# Patient Record
Sex: FEMALE | Race: WHITE | URBAN - METROPOLITAN AREA
[De-identification: names, ages, dates, MRNs, and addresses within clinical notes are randomized per-mention and may not be internally consistent; named-entity substitution may affect disease eponyms.]

---

## 2021-01-06 ENCOUNTER — OFFICE VISIT (OUTPATIENT)
Dept: URBAN - METROPOLITAN AREA CLINIC 32 | Facility: CLINIC | Age: 66
End: 2021-01-06
Payer: COMMERCIAL

## 2021-01-06 DIAGNOSIS — H25.13 CATARACT - NSC: ICD-10-CM

## 2021-01-06 DIAGNOSIS — H33.8 OTHER RETINAL DETACHMENTS: Primary | ICD-10-CM

## 2021-01-06 PROCEDURE — 92134 CPTRZ OPH DX IMG PST SGM RTA: CPT | Performed by: OPTOMETRIST

## 2021-01-06 PROCEDURE — 99204 OFFICE O/P NEW MOD 45 MIN: CPT | Performed by: OPTOMETRIST

## 2021-01-06 ASSESSMENT — INTRAOCULAR PRESSURE
OS: 17
OD: 17

## 2021-01-06 NOTE — IMPRESSION/PLAN
Impression: Cataract - NSC: H25.13. Plan: Cataracts account for the patient's complaints. Patient education was discussed regarding cataracts, lens options and surgery. Recommend Cataract consultation with surgeon. Order A-Scan and Corneal Topography. Hold off on filling any new glasses prescriptions until after the consultation. hold off until after retina eval  RTC 1-2 weeks for cataract consultation.

## 2021-01-06 NOTE — IMPRESSION/PLAN
Impression: Other retinal detachments: H33.8.  Plan: OD suspect retinal detachment, refer for retina eval 1-2 days

## 2021-01-07 ENCOUNTER — OFFICE VISIT (OUTPATIENT)
Dept: URBAN - METROPOLITAN AREA CLINIC 33 | Facility: CLINIC | Age: 66
End: 2021-01-07
Payer: COMMERCIAL

## 2021-01-07 DIAGNOSIS — H33.011 RETINAL DETACHMENT WITH SINGLE BREAK, RIGHT EYE: Primary | ICD-10-CM

## 2021-01-07 PROCEDURE — 92134 CPTRZ OPH DX IMG PST SGM RTA: CPT | Performed by: OPHTHALMOLOGY

## 2021-01-07 PROCEDURE — 92004 COMPRE OPH EXAM NEW PT 1/>: CPT | Performed by: OPHTHALMOLOGY

## 2021-01-07 RX ORDER — PREDNISOLONE ACETATE 10 MG/ML
1 % SUSPENSION/ DROPS OPHTHALMIC
Qty: 10 | Refills: 5 | Status: ACTIVE
Start: 2021-01-07

## 2021-01-07 RX ORDER — OFLOXACIN 3 MG/ML
0.3 % SOLUTION/ DROPS OPHTHALMIC
Qty: 5 | Refills: 3 | Status: INACTIVE
Start: 2021-01-07 | End: 2021-03-05

## 2021-01-07 ASSESSMENT — INTRAOCULAR PRESSURE
OD: 10
OS: 10

## 2021-01-07 NOTE — IMPRESSION/PLAN
Impression: Age-related nuclear cataract, bilateral: H25.13. Bilateral. Condition: stable. Vision: vision affected. Plan: Cataracts account for the patient's complaints. No treatment currently recommended. The patient will monitor vision changes and contact us with any decrease in vision.

## 2021-01-07 NOTE — IMPRESSION/PLAN
Impression: Retinal detachment with single break, right eye: H33.011. Right. Condition: macula on. Vision: vision affected. Plan: Advised patient of condition. Gonio exam of right eye shows a Temporal Retinal Detachment MAC ON, suspicious temporal retinal vein. Discussed diagnosis in detail with patient. Discussed risks of progression. Emergent surgical treatment is recommended to repair the retina PPVx RIGHT EYE. Surgical risks and benefits were discussed, explained and understood by patient. Unable to tell how much vision will be recovered. Discussed gas bubble and post-op care: no traveling, flying or high altitude for approximately 6 - 8 weeks. All questions answered. Advised patient to remain sedentary until her surgery tomorrow, in order to limit any further detachment of the retina. Patient elects to proceed with recommendation. RL1. Educational material provided to patient. Erxed Drops to pharmacy on Ofloxacin and Prednisolone _ Dr. Krista Chamorro aware of patient's allergy to Levofloxacin- reaction is tendonitis Okay to use Ofloxacin.  
OCT OD shows Mac On

## 2021-01-08 ENCOUNTER — SURGERY (OUTPATIENT)
Dept: URBAN - METROPOLITAN AREA SURGERY 11 | Facility: SURGERY | Age: 66
End: 2021-01-08
Payer: COMMERCIAL

## 2021-01-08 PROCEDURE — 67108 REPAIR DETACHED RETINA: CPT | Performed by: OPHTHALMOLOGY

## 2021-01-09 ENCOUNTER — POST-OPERATIVE VISIT (OUTPATIENT)
Dept: URBAN - METROPOLITAN AREA CLINIC 23 | Facility: CLINIC | Age: 66
End: 2021-01-09
Payer: COMMERCIAL

## 2021-01-09 PROCEDURE — 99024 POSTOP FOLLOW-UP VISIT: CPT | Performed by: OPTOMETRIST

## 2021-01-09 ASSESSMENT — INTRAOCULAR PRESSURE
OD: 17
OS: 12

## 2021-01-09 NOTE — IMPRESSION/PLAN
Impression: S/P 25g PPVX, AFX, C3F8 18%, EL OD - 1 Day. Encounter for surgical aftercare following surgery on a sense organ  Z48.810. Condition is improving - Plan: The surgical eye(s) is improving well. Continue to follow current drop plan and post operative instructions. Recommend artificial tears throughout post operative period. RTC for scheduled follow up.

## 2021-01-18 ENCOUNTER — POST-OPERATIVE VISIT (OUTPATIENT)
Dept: URBAN - METROPOLITAN AREA CLINIC 23 | Facility: CLINIC | Age: 66
End: 2021-01-18

## 2021-01-18 PROCEDURE — 99024 POSTOP FOLLOW-UP VISIT: CPT | Performed by: OPHTHALMOLOGY

## 2021-01-18 ASSESSMENT — INTRAOCULAR PRESSURE
OS: 17
OD: 9

## 2021-01-18 NOTE — IMPRESSION/PLAN
Impression: S/P 25g PPVX, AFX, C3F8 18%, EL OD - 10 Days. Encounter for surgical aftercare following surgery on a sense organ  Z48.810. Excellent post op course   Post operative instructions reviewed - Condition is improving - Plan: Due to Coronavirus COVID-19 pandemic and National Emergency, deferred Slit Lamp examination. Findings are based on OCT and Optos. Unable to obtain OCT due to Gas filled eye OD and Optos shows gas filled eye, retina fully attached. Advised patient to continue using Prednisolone QID OD until bottle empty then D/C. Recommend a retina follow - up in 1 mos.  --Continue Prednisolone acetate 1% QID OD until bottle empty then D/C

## 2021-02-04 ENCOUNTER — POST-OPERATIVE VISIT (OUTPATIENT)
Dept: URBAN - METROPOLITAN AREA CLINIC 23 | Facility: CLINIC | Age: 66
End: 2021-02-04

## 2021-02-04 PROCEDURE — 99024 POSTOP FOLLOW-UP VISIT: CPT | Performed by: OPTOMETRIST

## 2021-02-04 ASSESSMENT — INTRAOCULAR PRESSURE
OD: 11
OS: 15

## 2021-02-04 NOTE — IMPRESSION/PLAN
Impression: S/P 25g PPVX, AFX, C3F8 18%, EL OD - 27 Days. Encounter for surgical aftercare following surgery on a sense organ  Z48.810. Post operative instructions reviewed - Continue Prednisolone as directed. Very poor view of periphery through cataract, will refer to Dr. Aileen Rogers tomorrow. Consider cataract surgery ASAP.  Plan:

## 2021-02-05 ENCOUNTER — POST-OPERATIVE VISIT (OUTPATIENT)
Dept: URBAN - METROPOLITAN AREA CLINIC 23 | Facility: CLINIC | Age: 66
End: 2021-02-05

## 2021-02-05 PROCEDURE — 99024 POSTOP FOLLOW-UP VISIT: CPT | Performed by: OPHTHALMOLOGY

## 2021-02-05 ASSESSMENT — INTRAOCULAR PRESSURE
OD: 9
OS: 16

## 2021-02-05 NOTE — IMPRESSION/PLAN
Impression: S/P 25g PPVX, AFX, C3F8 18%, EL OD - 28 Days. Encounter for surgical aftercare following surgery on a sense organ  Z48.810. Excellent post op course   Post operative instructions reviewed - Condition is improving - Plan: Excellent post op course   Post operative instructions reviewed - Condition is improving - Discussed with patient exam of the right eye shows dense cataract making the vision blurry and the gas bubble, retina appears to be fully reattached. Advised patient that once the gas bubble is completely dissolved and the retina is stable can reassess and schedule patient for a cataract eval for the right eye.  --Continue Prednisolone acetate 1% QID OD until bottle empty then D/C

## 2021-03-05 ENCOUNTER — POST-OPERATIVE VISIT (OUTPATIENT)
Dept: URBAN - METROPOLITAN AREA CLINIC 23 | Facility: CLINIC | Age: 66
End: 2021-03-05
Payer: COMMERCIAL

## 2021-03-05 PROCEDURE — 99024 POSTOP FOLLOW-UP VISIT: CPT | Performed by: OPHTHALMOLOGY

## 2021-03-05 ASSESSMENT — INTRAOCULAR PRESSURE
OD: 10
OS: 17

## 2021-03-05 NOTE — IMPRESSION/PLAN
Impression: S/P 25g PPVX, AFX, C3F8 18%, EL OD - 56 Days. Encounter for surgical aftercare following surgery on a sense organ  Z48.810.  Excellent post op course   Post operative instructions reviewed - Condition is improving - OCT OD macula stable no edema Optos OD Retina fully reattached with gas bubble Plan: Excellent post op course   Post operative instructions reviewed - Condition is improving - Advised patient will follow up in 1 month to reassess the retina at that time if the retina is stable can proceed with cataract surgery OD. --Continue Prednisolone acetate 1% QID OD until bottle empty then D/C

## 2021-04-02 ENCOUNTER — POST-OPERATIVE VISIT (OUTPATIENT)
Dept: URBAN - METROPOLITAN AREA CLINIC 23 | Facility: CLINIC | Age: 66
End: 2021-04-02
Payer: COMMERCIAL

## 2021-04-02 PROCEDURE — 99024 POSTOP FOLLOW-UP VISIT: CPT | Performed by: OPHTHALMOLOGY

## 2021-04-02 ASSESSMENT — INTRAOCULAR PRESSURE
OD: 12
OS: 15

## 2021-04-02 NOTE — IMPRESSION/PLAN
Impression: S/P 25g PPVX, AFX, C3F8 18%, EL OD - 84 Days. Encounter for surgical aftercare following surgery on a sense organ  Z48.810. Excellent post op course   Post operative instructions reviewed - Condition is improving - Plan: Excellent post op course   Post operative instructions reviewed - Condition is improving -  Discussed with patient that she has a significant cataract, recommend cataract eval OD ASAP per Dr. Niurka Perez.

## 2021-04-07 ENCOUNTER — OFFICE VISIT (OUTPATIENT)
Dept: URBAN - METROPOLITAN AREA CLINIC 23 | Facility: CLINIC | Age: 66
End: 2021-04-07
Payer: COMMERCIAL

## 2021-04-07 DIAGNOSIS — H25.12 AGE-RELATED NUCLEAR CATARACT, LEFT EYE: ICD-10-CM

## 2021-04-07 PROCEDURE — 99214 OFFICE O/P EST MOD 30 MIN: CPT | Performed by: OPHTHALMOLOGY

## 2021-04-07 ASSESSMENT — VISUAL ACUITY
OS: 20/40
OD: 20/400

## 2021-04-07 ASSESSMENT — INTRAOCULAR PRESSURE
OD: 12
OS: 14

## 2021-04-07 NOTE — IMPRESSION/PLAN
Impression: Combined forms of age-related cataract, right eye: H25.811. Condition: established, worsening. Symptoms: could improve with surgery. S/P myopic LASIK  Plan: Cataract accounts for patient's complaints. Reviewed risks, benefits, and procedure. Patient desires surgery, schedule ce/iol OD then OS, RL2, standard IOL, distance refractive target, patient is clear for surgery in [place] [id number]. Discussed with patient the possible need for second surgery due to history of LASIK. Recommend ORA and Amy in [place] to help prevent secondary surgery.

## 2021-04-07 NOTE — IMPRESSION/PLAN
Impression: Age-related nuclear cataract, left eye: H25.12. Condition: established, worsening. Symptoms: could improve with surgery.  Plan: as above

## 2021-05-10 ENCOUNTER — PRE-OPERATIVE VISIT (OUTPATIENT)
Dept: URBAN - METROPOLITAN AREA CLINIC 23 | Facility: CLINIC | Age: 66
End: 2021-05-10
Payer: COMMERCIAL

## 2021-05-10 DIAGNOSIS — H25.811 COMBINED FORMS OF AGE-RELATED CATARACT, RIGHT EYE: Primary | ICD-10-CM

## 2021-05-10 PROCEDURE — 76519 ECHO EXAM OF EYE: CPT | Performed by: OPHTHALMOLOGY

## 2021-05-10 ASSESSMENT — PACHYMETRY
OS: 3.92
OD: 26.60
OD: 3.85
OS: 26.68

## 2021-05-19 ENCOUNTER — SURGERY (OUTPATIENT)
Dept: URBAN - METROPOLITAN AREA SURGERY 11 | Facility: SURGERY | Age: 66
End: 2021-05-19
Payer: COMMERCIAL

## 2021-05-19 PROCEDURE — 66984 XCAPSL CTRC RMVL W/O ECP: CPT | Performed by: OPHTHALMOLOGY

## 2021-05-20 ENCOUNTER — POST-OPERATIVE VISIT (OUTPATIENT)
Dept: URBAN - METROPOLITAN AREA CLINIC 23 | Facility: CLINIC | Age: 66
End: 2021-05-20

## 2021-05-20 DIAGNOSIS — Z48.810 ENCOUNTER FOR SURGICAL AFTERCARE FOLLOWING SURGERY ON A SENSE ORGAN: Primary | ICD-10-CM

## 2021-05-20 PROCEDURE — 99024 POSTOP FOLLOW-UP VISIT: CPT | Performed by: OPTOMETRIST

## 2021-05-20 ASSESSMENT — INTRAOCULAR PRESSURE
OD: 20
OS: 16

## 2021-05-20 NOTE — IMPRESSION/PLAN
Impression: S/P Cataract Extraction by phacoemulsification with IOL placement; DEXYCU; ORA OD - 1 Day. Encounter for surgical aftercare following surgery on a sense organ  Z48.810. Plan: Return if sudden vision change or increasing pain.

## 2021-05-27 ENCOUNTER — POST-OPERATIVE VISIT (OUTPATIENT)
Dept: URBAN - METROPOLITAN AREA CLINIC 23 | Facility: CLINIC | Age: 66
End: 2021-05-27
Payer: COMMERCIAL

## 2021-05-27 PROCEDURE — 99024 POSTOP FOLLOW-UP VISIT: CPT | Performed by: OPTOMETRIST

## 2021-05-27 ASSESSMENT — VISUAL ACUITY
OD: 20/50-
OS: 20/40-

## 2021-05-27 ASSESSMENT — INTRAOCULAR PRESSURE
OS: 15
OD: 15

## 2021-05-27 NOTE — IMPRESSION/PLAN
Impression: S/P Cataract Extraction by phacoemulsification with IOL placement; DEXYCU; ORA OD - 8 Days. Encounter for surgical aftercare following surgery on a sense organ  Z48.810. Post operative instructions reviewed - Plan: Return if sudden vision change or increasing pain.

## 2021-06-01 ENCOUNTER — SURGERY (OUTPATIENT)
Dept: URBAN - METROPOLITAN AREA SURGERY 11 | Facility: SURGERY | Age: 66
End: 2021-06-01
Payer: COMMERCIAL

## 2021-06-01 PROCEDURE — 66984 XCAPSL CTRC RMVL W/O ECP: CPT | Performed by: OPHTHALMOLOGY

## 2021-06-02 ENCOUNTER — POST-OPERATIVE VISIT (OUTPATIENT)
Dept: URBAN - METROPOLITAN AREA CLINIC 23 | Facility: CLINIC | Age: 66
End: 2021-06-02

## 2021-06-02 PROCEDURE — 99024 POSTOP FOLLOW-UP VISIT: CPT | Performed by: OPTOMETRIST

## 2021-06-02 ASSESSMENT — INTRAOCULAR PRESSURE
OS: 40
OD: 10
OS: 35

## 2021-06-02 NOTE — IMPRESSION/PLAN
Impression: S/P Cataract Extraction by phacoemulsification with IOL placement; ORA; Dexycu Sample OS - 1 Day. Presence of intraocular lens  Z96.1. Post operative instructions reviewed - Cataract OS. Plan: Pt given a sample of Alphagan-P to use TID for elevated pressure OS.

## 2021-06-09 ENCOUNTER — POST-OPERATIVE VISIT (OUTPATIENT)
Dept: URBAN - METROPOLITAN AREA CLINIC 23 | Facility: CLINIC | Age: 66
End: 2021-06-09

## 2021-06-09 DIAGNOSIS — Z96.1 PRESENCE OF INTRAOCULAR LENS: Primary | ICD-10-CM

## 2021-06-09 PROCEDURE — 99024 POSTOP FOLLOW-UP VISIT: CPT | Performed by: OPTOMETRIST

## 2021-06-09 ASSESSMENT — INTRAOCULAR PRESSURE
OD: 9
OS: 13

## 2021-06-09 ASSESSMENT — VISUAL ACUITY
OD: 20/50
OS: 20/20

## 2021-06-09 NOTE — IMPRESSION/PLAN
Impression: S/P Cataract Extraction by phacoemulsification with IOL placement; ORA; Dexycu Sample OS - 8 Days. Presence of intraocular lens  Z96.1. Post operative instructions reviewed - Cataract OU. Plan: --Advised patient to use artificial tears for comfort. 
--Start Prednisolone OD TID x1 wk, BID x1 wk, once a week, then d/c

## 2021-06-15 ENCOUNTER — POST-OPERATIVE VISIT (OUTPATIENT)
Dept: URBAN - METROPOLITAN AREA CLINIC 23 | Facility: CLINIC | Age: 66
End: 2021-06-15

## 2021-06-15 PROCEDURE — 99024 POSTOP FOLLOW-UP VISIT: CPT | Performed by: OPTOMETRIST

## 2021-06-15 ASSESSMENT — INTRAOCULAR PRESSURE
OS: 15
OD: 13

## 2021-06-15 NOTE — IMPRESSION/PLAN
Impression: S/P Cataract Extraction by phacoemulsification with IOL placement; ORA; Dexycu Sample OS - 14 Days. Presence of intraocular lens  Z96.1. Plan: Pt was informed to use the Prednisolone. She will be heading back to [place], pt informed to go to an eye clinic if she still has the pain after Prednisolone. Also informed after 90 days OK to get a YAG.  --Taper Prednisolone acetate 1% TID x 1 wk, BID x 1wk, QD x 1wk, then d/c

## 2022-11-29 ENCOUNTER — OFFICE VISIT (OUTPATIENT)
Dept: URBAN - METROPOLITAN AREA CLINIC 32 | Facility: CLINIC | Age: 67
End: 2022-11-29
Payer: COMMERCIAL

## 2022-11-29 DIAGNOSIS — H02.839 DERMATOCHALASIS OF UNSPECIFIED EYE, UNSPECIFIED EYELID: ICD-10-CM

## 2022-11-29 DIAGNOSIS — H26.491 OTHER SECONDARY CATARACT, RIGHT EYE: Primary | ICD-10-CM

## 2022-11-29 PROCEDURE — 99214 OFFICE O/P EST MOD 30 MIN: CPT | Performed by: OPTOMETRIST

## 2022-11-29 ASSESSMENT — KERATOMETRY
OS: 40.13
OD: 40.75

## 2022-11-29 ASSESSMENT — INTRAOCULAR PRESSURE
OS: 16
OD: 15

## 2022-11-29 NOTE — IMPRESSION/PLAN
Impression: Other secondary cataract, right eye: H26.491. Plan: Discussed diagnosis in detail with patient. Discussed treatment options with patient. No treatment is required at this time. Will continue to observe condition and or symptoms.

## 2022-12-01 ENCOUNTER — OFFICE VISIT (OUTPATIENT)
Dept: URBAN - METROPOLITAN AREA CLINIC 32 | Facility: CLINIC | Age: 67
End: 2022-12-01
Payer: COMMERCIAL

## 2022-12-01 DIAGNOSIS — H02.834 DERMATOCHALASIS OF LEFT UPPER EYELID: ICD-10-CM

## 2022-12-01 DIAGNOSIS — H02.831 DERMATOCHALASIS OF RIGHT UPPER EYELID: Primary | ICD-10-CM

## 2022-12-01 PROCEDURE — 99213 OFFICE O/P EST LOW 20 MIN: CPT | Performed by: OPHTHALMOLOGY

## 2022-12-01 ASSESSMENT — INTRAOCULAR PRESSURE
OD: 13
OS: 18

## 2022-12-01 NOTE — IMPRESSION/PLAN
Impression: Dermatochalasis of right upper eyelid: H02.831. Right. Condition: established, stable. Symptoms: will continue to monitor. Vision: vision not affected. Plan: Discussed diagnosis in detail with patient. Discussed treatment options with patient. No treatment is required at this time, dermatochalasis is mild/moderate and not yet affecting vision. Patient may opt for cosmetic surgery, patient defers at this time.

## 2022-12-01 NOTE — IMPRESSION/PLAN
Impression: Dermatochalasis of left upper eyelid: H02.834. Left. Condition: established, stable. Symptoms: will continue to monitor. Vision: vision not affected.  Plan: Discussion listed in plan #1

## 2023-01-26 ENCOUNTER — OFFICE VISIT (OUTPATIENT)
Dept: URBAN - METROPOLITAN AREA CLINIC 32 | Facility: CLINIC | Age: 68
End: 2023-01-26
Payer: COMMERCIAL

## 2023-01-26 ENCOUNTER — OFFICE VISIT (OUTPATIENT)
Dept: URBAN - METROPOLITAN AREA CLINIC 23 | Facility: CLINIC | Age: 68
End: 2023-01-26
Payer: COMMERCIAL

## 2023-01-26 DIAGNOSIS — H33.011 RETINAL DETACHMENT WITH SINGLE BREAK, RIGHT EYE: ICD-10-CM

## 2023-01-26 DIAGNOSIS — H31.091 CHORIORETINAL SCARS, RIGHT EYE: ICD-10-CM

## 2023-01-26 DIAGNOSIS — H33.012 RETINAL DETACHMENT WITH SINGLE BREAK, LEFT EYE: Primary | ICD-10-CM

## 2023-01-26 DIAGNOSIS — H33.002 UNSPECIFIED RETINAL DETACHMENT WITH RETINAL BREAK, LEFT EYE: Primary | ICD-10-CM

## 2023-01-26 PROCEDURE — 92134 CPTRZ OPH DX IMG PST SGM RTA: CPT | Performed by: OPHTHALMOLOGY

## 2023-01-26 PROCEDURE — 99213 OFFICE O/P EST LOW 20 MIN: CPT

## 2023-01-26 PROCEDURE — 92134 CPTRZ OPH DX IMG PST SGM RTA: CPT

## 2023-01-26 PROCEDURE — 99214 OFFICE O/P EST MOD 30 MIN: CPT | Performed by: OPHTHALMOLOGY

## 2023-01-26 RX ORDER — PREDNISOLONE ACETATE 10 MG/ML
1 % SUSPENSION/ DROPS OPHTHALMIC
Qty: 10 | Refills: 1 | Status: ACTIVE
Start: 2023-01-26

## 2023-01-26 RX ORDER — OFLOXACIN 3 MG/ML
0.3 % SOLUTION/ DROPS OPHTHALMIC
Qty: 5 | Refills: 1 | Status: ACTIVE
Start: 2023-01-26

## 2023-01-26 ASSESSMENT — INTRAOCULAR PRESSURE
OS: 18
OS: 18
OD: 11
OD: 11

## 2023-01-26 NOTE — IMPRESSION/PLAN
Impression: s/p PPV for Retinal detachment with single break, right eye: H33.011. Right. Condition: stable post sx. . Vision: vision affected. s/p PPV< ERMx, EL, C3F8 OD 01/02/21 Plan: Discussed diagnosis in detail with patient. Exam, OCT and Optos OD shows retina attached and stable appearing. No treatment is required at this time. Will continue to observe condition and or symptoms.

## 2023-01-26 NOTE — IMPRESSION/PLAN
Impression: Retinal detachment with single break, left eye: H33.012. Left. Condition: new problem addtl w/u needed. Vision: vision affected. Plan: Discussed diagnosis in detail with patient. Exam and Optos OS shows a small retinal tear w/ a retinal detachment. Discussed risks of progression. Surgical treatment is recommended to repair the retina PPVx OS. Surgical risks and benefits were discussed, explained and understood by patient. Unable to tell how much vision will be recovered. Discussed gas bubble and post-op care: no traveling, flying or high altitude for approximately 6 - 8 weeks. All questions answered. Patient elects to proceed with recommendation. RL1. Educational material provided to patient. OCT shows stable, attached centrally.

## 2023-01-26 NOTE — IMPRESSION/PLAN
Impression: Chorioretinal scars, right eye: H31.091. Plan: s/p RD repair OD. Stable appearance, monitor.

## 2023-01-26 NOTE — IMPRESSION/PLAN
Impression: Unspecified retinal detachment with retinal break, left eye: H33.002. Plan: Pt educated on findings. DFE shows uneven retinal appearance, MOCT shows retinal detachment far temporally. Referral sent to retina for patient to be seen ASAP for treatment.

## 2023-02-03 ENCOUNTER — POST-OPERATIVE VISIT (OUTPATIENT)
Dept: URBAN - METROPOLITAN AREA CLINIC 23 | Facility: CLINIC | Age: 68
End: 2023-02-03
Payer: COMMERCIAL

## 2023-02-03 DIAGNOSIS — Z48.810 ENCOUNTER FOR SURGICAL AFTERCARE FOLLOWING SURGERY ON A SENSE ORGAN: Primary | ICD-10-CM

## 2023-02-03 PROCEDURE — 99024 POSTOP FOLLOW-UP VISIT: CPT | Performed by: OPHTHALMOLOGY

## 2023-02-03 ASSESSMENT — INTRAOCULAR PRESSURE
OD: 13
OS: 19

## 2023-02-03 NOTE — IMPRESSION/PLAN
Impression: S/P PPVx 25g, EL, AFX, Injection of Intraocular Gas C3F8 18%, Kenalog, PCO Removal- [id number] OS - 7 Days. Encounter for surgical aftercare following surgery on a sense organ  Z48.810. Excellent post op course; Condition is improving - Plan: Deferred slit lamp exam, findings based on diagnostic tests. OCT OS shows stable and Optos OS shows appears in good position, laser tx. No sleeping position restrictions - ok to lay on back. Continue Prednisolone QID OS, d/c Ofloxacin after today. Will reassess the retina in 3 - 4 weeks.

## 2023-02-20 ENCOUNTER — POST-OPERATIVE VISIT (OUTPATIENT)
Dept: URBAN - METROPOLITAN AREA CLINIC 33 | Facility: CLINIC | Age: 68
End: 2023-02-20
Payer: MEDICARE

## 2023-02-20 DIAGNOSIS — Z48.810 ENCOUNTER FOR SURGICAL AFTERCARE FOLLOWING SURGERY ON A SENSE ORGAN: Primary | ICD-10-CM

## 2023-02-20 PROCEDURE — 99024 POSTOP FOLLOW-UP VISIT: CPT | Performed by: OPHTHALMOLOGY

## 2023-02-20 ASSESSMENT — INTRAOCULAR PRESSURE
OS: 18
OD: 13

## 2023-02-20 NOTE — IMPRESSION/PLAN
Impression: S/P PPVx 25g, EL, AFX, Injection of Intraocular Gas C3F8 18%, Kenalog, PCO Removal- [id number] OS - 24 Days. Encounter for surgical aftercare following surgery on a sense organ  Z48.810. Excellent post op course   Post operative instructions reviewed - Plan: Exam and OCT shows the retina to be attached w/ gas. Pt may increase her activity, just no travel to high elevation still. Will reassess the retina in 6 weeks. Pt to continue Prednisolone QID OS until gone, then d/c.

## 2023-04-13 ENCOUNTER — POST-OPERATIVE VISIT (OUTPATIENT)
Dept: URBAN - METROPOLITAN AREA CLINIC 33 | Facility: CLINIC | Age: 68
End: 2023-04-13
Payer: MEDICARE

## 2023-04-13 DIAGNOSIS — Z48.810 ENCOUNTER FOR SURGICAL AFTERCARE FOLLOWING SURGERY ON A SENSE ORGAN: Primary | ICD-10-CM

## 2023-04-13 PROCEDURE — 99024 POSTOP FOLLOW-UP VISIT: CPT | Performed by: OPHTHALMOLOGY

## 2023-04-13 ASSESSMENT — INTRAOCULAR PRESSURE
OS: 14
OD: 20

## 2023-04-13 NOTE — IMPRESSION/PLAN
Impression: S/P PPVx 25g, EL, AFX, Injection of Intraocular Gas C3F8 18%, Kenalog, PCO Removal- [id number] OS - 76 Days. Encounter for surgical aftercare following surgery on a sense organ  Z48.810. Plan: Exam OS shows the retina is attached and stable, pt can remove the green bracelet. Her vision has improved. recommend a follow - up in 3 mos w/Optometrist. OCT OS shows the macula is stable and Optos OS shows the retina is attached w/a very small gas bubble.